# Patient Record
Sex: MALE | NOT HISPANIC OR LATINO
[De-identification: names, ages, dates, MRNs, and addresses within clinical notes are randomized per-mention and may not be internally consistent; named-entity substitution may affect disease eponyms.]

---

## 2024-02-09 ENCOUNTER — APPOINTMENT (OUTPATIENT)
Dept: ORTHOPEDIC SURGERY | Facility: CLINIC | Age: 64
End: 2024-02-09
Payer: COMMERCIAL

## 2024-02-09 PROBLEM — Z00.00 ENCOUNTER FOR PREVENTIVE HEALTH EXAMINATION: Status: ACTIVE | Noted: 2024-02-09

## 2024-02-09 PROCEDURE — 73010 X-RAY EXAM OF SHOULDER BLADE: CPT | Mod: RT

## 2024-02-09 PROCEDURE — 99204 OFFICE O/P NEW MOD 45 MIN: CPT

## 2024-02-09 PROCEDURE — 73030 X-RAY EXAM OF SHOULDER: CPT | Mod: RT

## 2024-02-09 RX ORDER — MELOXICAM 7.5 MG/1
7.5 TABLET ORAL
Qty: 30 | Refills: 0 | Status: ACTIVE | COMMUNITY
Start: 2024-02-09 | End: 1900-01-01

## 2024-02-09 NOTE — DATA REVIEWED
[FreeTextEntry1] : Right X-Ray Examination of the SHOULDER (4 views):  no fractures, subluxations or dislocations.  AC Joint Arthrosis  X-Ray Examination of the SCAPULA 1 or 2 views shows: no significant abnormalities , there is Type II acromion.. There is an acromial spur.  No intrarticular loose bodies, joint spaces well maintained AHI > 7mm  No disruption in shentons line, evidence of subchondral cysts and sclerosis of undersurface of acomion, humeral head well centered

## 2024-02-09 NOTE — HISTORY OF PRESENT ILLNESS
[de-identified] : Date of Injury/Onset: Patient states he has had pain in the right shoulder for the past 3 months. Patient is a  and performs heavy lifting but recalls no prior injury. Patient denies paresthesia. Pain: At Rest: 4 With Activity: 10 Mechanism of injury: This is NOT a Work Related Injury being treated under Worker's Compensation. This is NOT an athletic injury occurring associated with an interscholastic or organized sports team. Quality of symptoms: Sharp Improves with: Ointment; Massage Worse with:Lifting of the arm Prior treatment: No Prior Imaging:  Reports Available For Review Today: Out of work/sport:No School/Sport/Position/Occupation:  Personal goal: Additional Information: 3 months atraumatic right shoulder pain, worse at night and with overhead activities, improved with rest, denies numbness and tingling RUE. Has not been seen or evaluated. No tx therapy injections or surgery.

## 2024-02-09 NOTE — IMAGING
[de-identified] :    RIGHT Shoulder  Inspection:  Scapula Winging: Negative  Deformity: None  Erythema: None  Ecchymosis: None  Abrasions: None  Effusion: None     Range of Motion:  Active Forward Flexion: 160 degrees  Passive Forward Flexion: 170 degrees  Active IR : L4  Passive ER : 30 degrees     Motor Exam:  Forward Flexion: 4+ out of 5  Flexion Plane of Scapula: 5 out of 5  Abduction: 4+ out of 5  Internal Rotation: 5 out of 5  External Rotation: 4+ out of 5  Distal Motor Strength: 5 out of 5       Provocative Tests:  Drop Arm: Negative  Barber/Impingement: Positive  [place]: Positive  X-Arm Adduction: positive Belly Press: Negative  Bear Hug: Negative  Lift Off: Negative  Apprehension: Negative  Relocation: Negative  Posterior Load & Shift: Negative     Palpation:  AC Joint: Nontender  Clavicle: Nontender  SC Joint: Nontender  Bicipital Groove: Positive  Coracoid Process: Nontender  Pectoralis Minor Tendon: Nontender  Pectoralis Major Tendon: Nontender & palpably intact  Latissimus Dorsi: Nontender  Proximal Humerus: Positive  Scapula Body: Nontender  Medial Scapula Boarder: Nontender  Scapula Spine: Nontender     Neurologic Exam: Sensation to Light Touch:  Axillary: Grossly intact  Ulnar: Grossly intact  Radial: Grossly intact  Median: Grossly intact  Other:  N/A     Circulatory/Pulses:  Ulnar: 2+  Radial: 2+  Other Pertinent Findings: None       LEFT Shoulder  Range of Motion:  Active Forward Flexion: 180 degrees  Active Abduction: 180 degrees  Passive Forward Flexion: 180 degrees  Passive Abduction: 180 degrees  ER @ 90 degrees: 90 degrees  IR @ 90 degrees: 45 degrees  ER @ 0 degrees: 50 degrees     Motor Exam:  Forward Flexion: 5 out of 5  Flexion Plane of Scapula: 5 out of 5  Abduction: 5 out of 5  Internal Rotation: 5 out of 5  External Rotation: 5 out of 5  Distal Motor Strength: 5 out of 5     Stability Testing:  Anterior: 1+  Posterior: 1+  Sulcus N: 1+  Sulcus ER: 1+     Other Pertinent Findings: None

## 2024-02-09 NOTE — DISCUSSION/SUMMARY
[de-identified] :  Patient and I discussed their symptoms. Discussed findings of today's exam and possible causes of patient's pain. Educated patient on their most probable diagnosis. Reviewed possible courses of treatment, and we collaboratively decided best course of treatment at this time will include:    1. Start PT and HEP/RICE  2. Discussed CSI Injection, if pain does not improve   3. Discussed obtaining MRI, if patient does not see any improvement in 6 weeks    The patient's current medication management of their orthopedic diagnosis was reviewed today:     (1) We discussed a comprehensive treatment plan that included possible pharmaceutical management involving the use of prescription strength medications including but not limited to options such as oral Naprosyn 500mg BID, once daily Meloxicam 15 mg, or 500-650 mg Tylenol versus over the counter oral medications and topical prescription NSAID Pennsaid vs over the counter Voltaren gel.     (2) There is a moderate risk of morbidity with further treatment, especially from use of prescription strength medications and possible side effects of these medications which include upset stomach with oral medications, skin reactions to topical medications and cardiac/renal issues with long term use.     (3) I recommended that the patient follow-up with their medical physician to discuss any significant specific potential issues with long term medication use such as interactions with current medications or with exacerbation of underlying medical comorbidities.     (4) The benefits and risks associated with use of injectable, oral or topical, prescription and over the counter anti-inflammatory medications were discussed with the patient. The patient voiced understanding of the risks including but not limited to bleeding, stroke, kidney dysfunction, heart disease, and were referred to the black box warning label for further information.    Prior to appointment and during encounter with patient extensive medical records were reviewed including but not limited to, hospital records, out patient records, imaging results, and lab data. During this appointment the patient was examined, diagnoses were discussed and explained in a face to face manner. In addition extensive time was spent reviewing aforementioned diagnostic studies. Counseling including abnormal image results, differential diagnoses, treatment options, risk and benefits, lifestyle changes, current condition, and current medications was performed. Patient's comments, questions, and concerns were address and patient verbalized understanding.    Follow up in 1 months  d/w pt if no improvement w/1st line tx we will order MR.  Next visit: Consider MR , assess biceps pain/cuff pain/weakness (volutional today but 5/5)

## 2024-05-16 ENCOUNTER — APPOINTMENT (OUTPATIENT)
Dept: ORTHOPEDIC SURGERY | Facility: CLINIC | Age: 64
End: 2024-05-16
Payer: COMMERCIAL

## 2024-05-16 DIAGNOSIS — M75.21 BICIPITAL TENDINITIS, RIGHT SHOULDER: ICD-10-CM

## 2024-05-16 DIAGNOSIS — M75.81 OTHER SHOULDER LESIONS, RIGHT SHOULDER: ICD-10-CM

## 2024-05-16 DIAGNOSIS — M75.51 BURSITIS OF RIGHT SHOULDER: ICD-10-CM

## 2024-05-16 PROCEDURE — 99214 OFFICE O/P EST MOD 30 MIN: CPT

## 2024-05-16 RX ORDER — MELOXICAM 7.5 MG/1
7.5 TABLET ORAL
Qty: 30 | Refills: 0 | Status: ACTIVE | COMMUNITY
Start: 2024-05-16 | End: 1900-01-01

## 2024-05-16 NOTE — DISCUSSION/SUMMARY
[de-identified] :  Patient and I discussed their symptoms. Discussed findings of today's exam and possible causes of patient's pain. Educated patient on their most probable diagnosis. Reviewed possible courses of treatment, and we collaboratively decided best course of treatment at this time will include:    1. Start PT and HEP/RICE  2. Discussed CSI Injection, if pain does not improve   3. Discussed obtaining MRI, if patient does not see any improvement in 6 weeks    The patient's current medication management of their orthopedic diagnosis was reviewed today:     (1) We discussed a comprehensive treatment plan that included possible pharmaceutical management involving the use of prescription strength medications including but not limited to options such as oral Naprosyn 500mg BID, once daily Meloxicam 15 mg, or 500-650 mg Tylenol versus over the counter oral medications and topical prescription NSAID Pennsaid vs over the counter Voltaren gel.     (2) There is a moderate risk of morbidity with further treatment, especially from use of prescription strength medications and possible side effects of these medications which include upset stomach with oral medications, skin reactions to topical medications and cardiac/renal issues with long term use.     (3) I recommended that the patient follow-up with their medical physician to discuss any significant specific potential issues with long term medication use such as interactions with current medications or with exacerbation of underlying medical comorbidities.     (4) The benefits and risks associated with use of injectable, oral or topical, prescription and over the counter anti-inflammatory medications were discussed with the patient. The patient voiced understanding of the risks including but not limited to bleeding, stroke, kidney dysfunction, heart disease, and were referred to the black box warning label for further information.    Prior to appointment and during encounter with patient extensive medical records were reviewed including but not limited to, hospital records, out patient records, imaging results, and lab data. During this appointment the patient was examined, diagnoses were discussed and explained in a face to face manner. In addition extensive time was spent reviewing aforementioned diagnostic studies. Counseling including abnormal image results, differential diagnoses, treatment options, risk and benefits, lifestyle changes, current condition, and current medications was performed. Patient's comments, questions, and concerns were address and patient verbalized understanding.    Follow up in 1 months  d/w pt if no improvement w/1st line tx we will order MR.  Next visit: Consider MR , assess biceps pain/cuff pain/weakness (volutional today but 5/5) **** Pt still has symptoms suggestive of cuff tear/biceps tendinitis Pt has not improved with PT  Recommend MRI R shoulder to assess for cuff tear that is refractory to nonoperative tx RTC after MR for further evaluation and management  next visit: MR review, possible CSI v Sx conversation

## 2024-05-16 NOTE — HISTORY OF PRESENT ILLNESS
[de-identified] : Date of Injury/Onset: Patient states he has had pain in the right shoulder for the past 3 months. Patient is a  and performs heavy lifting but recalls no prior injury. Patient denies paresthesia. Pain: At Rest: 4 With Activity: 10 Mechanism of injury: This is NOT a Work Related Injury being treated under Worker's Compensation. This is NOT an athletic injury occurring associated with an interscholastic or organized sports team. Quality of symptoms: Sharp Improves with: Ointment; Massage Worse with:Lifting of the arm Prior treatment: No Prior Imaging:  Reports Available For Review Today: Out of work/sport:No School/Sport/Position/Occupation:  Personal goal: Additional Information: 3 months atraumatic right shoulder pain, worse at night and with overhead activities, improved with rest, denies numbness and tingling RUE. Has not been seen or evaluated. No tx therapy injections or surgery.  05/16/2024 [facility] 63 year M is here today to follow up on right shoulder, patient reports some slightly improvement with pain since last visit, patient stopped PT and had been doing home exercises and taking Tylenol for pain relief.

## 2024-07-25 ENCOUNTER — APPOINTMENT (OUTPATIENT)
Dept: ORTHOPEDIC SURGERY | Facility: CLINIC | Age: 64
End: 2024-07-25

## 2024-07-25 DIAGNOSIS — M75.51 BURSITIS OF RIGHT SHOULDER: ICD-10-CM

## 2024-07-25 DIAGNOSIS — M75.21 BICIPITAL TENDINITIS, RIGHT SHOULDER: ICD-10-CM

## 2024-07-25 DIAGNOSIS — M75.81 OTHER SHOULDER LESIONS, RIGHT SHOULDER: ICD-10-CM

## 2024-07-25 PROCEDURE — 20610 DRAIN/INJ JOINT/BURSA W/O US: CPT | Mod: RT

## 2024-07-25 PROCEDURE — 99213 OFFICE O/P EST LOW 20 MIN: CPT | Mod: 25

## 2024-07-25 NOTE — DISCUSSION/SUMMARY
[de-identified] :  Patient and I discussed their symptoms. Discussed findings of today's exam and possible causes of patient's pain. Educated patient on their most probable diagnosis. Reviewed possible courses of treatment, and we collaboratively decided best course of treatment at this time will include:    1. Start PT and HEP/RICE  2. Discussed CSI Injection, if pain does not improve   3. Discussed obtaining MRI, if patient does not see any improvement in 6 weeks    The patient's current medication management of their orthopedic diagnosis was reviewed today:     (1) We discussed a comprehensive treatment plan that included possible pharmaceutical management involving the use of prescription strength medications including but not limited to options such as oral Naprosyn 500mg BID, once daily Meloxicam 15 mg, or 500-650 mg Tylenol versus over the counter oral medications and topical prescription NSAID Pennsaid vs over the counter Voltaren gel.     (2) There is a moderate risk of morbidity with further treatment, especially from use of prescription strength medications and possible side effects of these medications which include upset stomach with oral medications, skin reactions to topical medications and cardiac/renal issues with long term use.     (3) I recommended that the patient follow-up with their medical physician to discuss any significant specific potential issues with long term medication use such as interactions with current medications or with exacerbation of underlying medical comorbidities.     (4) The benefits and risks associated with use of injectable, oral or topical, prescription and over the counter anti-inflammatory medications were discussed with the patient. The patient voiced understanding of the risks including but not limited to bleeding, stroke, kidney dysfunction, heart disease, and were referred to the black box warning label for further information.    Prior to appointment and during encounter with patient extensive medical records were reviewed including but not limited to, hospital records, out patient records, imaging results, and lab data. During this appointment the patient was examined, diagnoses were discussed and explained in a face to face manner. In addition extensive time was spent reviewing aforementioned diagnostic studies. Counseling including abnormal image results, differential diagnoses, treatment options, risk and benefits, lifestyle changes, current condition, and current medications was performed. Patient's comments, questions, and concerns were address and patient verbalized understanding.    Follow up in 1 months  d/w pt if no improvement w/1st line tx we will order MR.  Next visit: Consider MR , assess biceps pain/cuff pain/weakness (volutional today but 5/5) **** Pt still has symptoms suggestive of cuff tear/biceps tendinitis Pt has not improved with PT  Recommend MRI R shoulder to assess for cuff tear that is refractory to nonoperative tx RTC after MR for further evaluation and management  next visit: MR review, possible CSI v Sx conversation *** R shoulder pain biceps tenosynovitis, partial cuff tear d/w pt tx options injection v sx debridement, biceps tenodesis, mini rei pt interested in trying the injection and PT RTC 6 weeks   if no improvement consider  sx

## 2024-07-25 NOTE — ASSESSMENT
[FreeTextEntry1] : MRI shoulder right ( stand up MRI) 6/14/2024 supraspinatus tendonitis  diffuse blunting/tear of labrum  long head biceps tendonitis  Mild osteoarthritis changes.

## 2024-07-25 NOTE — PROCEDURE
[FreeTextEntry1] : R shoulder injection [FreeTextEntry2] : R shoulder pain [FreeTextEntry3] : Procedure: [place] injection of the right subacromial space  Indication:  subacromial bursitis/impingement, pain Risk, benefits and alternatives were discussed with the patient. Potential complications include bleeding and infection.  Alcohol was used to prep the area.  Ethyl chloride spray was used as a topical anesthetic.  Using sterile technique, the injection needle was then directed from a standard posterior approach parrallel to and inferiort to the acromion.  aspect.  The procedure was not ultrasound guided.  A 1.5 inch 21g needle was used to inject 3 mL of 1% Lidocaine, 3 mL 0.25% Bupivacaine and 2 mL 40mg/mL triamcinolone.  No significant reistance was encountered,   A bandage was applied.  The patient tolerated the procedure well.   Within 5 minutes of the injection and before departing the clinic he reported a [60%] reduction in baseline pain. Complications: None.  Patient instructed to avoid strenuous activity for 2 day(s).  Follow-up in the office as needed, in 3 months for repeat injection, if pain remains unresolved, or for further concerns.  Specifically counseled regarding the signs and symptoms of potential infection and instructed to present promptly to clinic or hospital if such signs and symptoms arise.

## 2024-07-25 NOTE — IMAGING
[de-identified] :    RIGHT Shoulder  Inspection:  Scapula Winging: Negative  Deformity: None  Erythema: None  Ecchymosis: None  Abrasions: None  Effusion: None     Range of Motion:  Active Forward Flexion: 160 degrees  Passive Forward Flexion: 170 degrees  Active IR : L4  Passive ER : 30 degrees     Motor Exam:  Forward Flexion: 4+ out of 5  Flexion Plane of Scapula: 5 out of 5  Abduction: 4+ out of 5  Internal Rotation: 5 out of 5  External Rotation: 4+ out of 5  Distal Motor Strength: 5 out of 5       Provocative Tests:  Drop Arm: Negative  Barber/Impingement: Positive  [place]: Positive  X-Arm Adduction: positive Belly Press: Negative  Bear Hug: Negative  Lift Off: Negative  Apprehension: Negative  Relocation: Negative  Posterior Load & Shift: Negative     Palpation:  AC Joint: Nontender  Clavicle: Nontender  SC Joint: Nontender  Bicipital Groove: Positive  Coracoid Process: Nontender  Pectoralis Minor Tendon: Nontender  Pectoralis Major Tendon: Nontender & palpably intact  Latissimus Dorsi: Nontender  Proximal Humerus: Positive  Scapula Body: Nontender  Medial Scapula Boarder: Nontender  Scapula Spine: Nontender     Neurologic Exam: Sensation to Light Touch:  Axillary: Grossly intact  Ulnar: Grossly intact  Radial: Grossly intact  Median: Grossly intact  Other:  N/A     Circulatory/Pulses:  Ulnar: 2+  Radial: 2+  Other Pertinent Findings: None       LEFT Shoulder  Range of Motion:  Active Forward Flexion: 180 degrees  Active Abduction: 180 degrees  Passive Forward Flexion: 180 degrees  Passive Abduction: 180 degrees  ER @ 90 degrees: 90 degrees  IR @ 90 degrees: 45 degrees  ER @ 0 degrees: 50 degrees     Motor Exam:  Forward Flexion: 5 out of 5  Flexion Plane of Scapula: 5 out of 5  Abduction: 5 out of 5  Internal Rotation: 5 out of 5  External Rotation: 5 out of 5  Distal Motor Strength: 5 out of 5     Stability Testing:  Anterior: 1+  Posterior: 1+  Sulcus N: 1+  Sulcus ER: 1+     Other Pertinent Findings: None

## 2024-07-25 NOTE — HISTORY OF PRESENT ILLNESS
[de-identified] : Date of Injury/Onset: Patient states he has had pain in the right shoulder for the past 3 months. Patient is a  and performs heavy lifting but recalls no prior injury. Patient denies paresthesia. Pain: At Rest: 4 With Activity: 10 Mechanism of injury: This is NOT a Work Related Injury being treated under Worker's Compensation. This is NOT an athletic injury occurring associated with an interscholastic or organized sports team. Quality of symptoms: Sharp Improves with: Ointment; Massage Worse with:Lifting of the arm Prior treatment: No Prior Imaging:  Reports Available For Review Today: Out of work/sport:No School/Sport/Position/Occupation:  Personal goal: Additional Information: 3 months atraumatic right shoulder pain, worse at night and with overhead activities, improved with rest, denies numbness and tingling RUE. Has not been seen or evaluated. No tx therapy injections or surgery.  05/16/2024 [facility] 63 year M is here today to follow up on right shoulder, patient reports some slightly improvement with pain since last visit, patient stopped PT and had been doing home exercises and taking Tylenol for pain relief.  7/25/24: Patient here to follow up on his R shoulder and review MRI results. Pain has improved its symptoms with PT, nsiads.

## 2024-09-05 ENCOUNTER — APPOINTMENT (OUTPATIENT)
Dept: ORTHOPEDIC SURGERY | Facility: CLINIC | Age: 64
End: 2024-09-05